# Patient Record
Sex: MALE | Race: OTHER | Employment: PART TIME | URBAN - METROPOLITAN AREA
[De-identification: names, ages, dates, MRNs, and addresses within clinical notes are randomized per-mention and may not be internally consistent; named-entity substitution may affect disease eponyms.]

---

## 2017-06-16 ENCOUNTER — HOSPITAL ENCOUNTER (EMERGENCY)
Age: 37
Discharge: HOME | End: 2017-06-17
Attending: EMERGENCY MEDICINE
Payer: SELF-PAY

## 2017-06-16 ENCOUNTER — APPOINTMENT (OUTPATIENT)
Dept: CT IMAGING | Age: 37
End: 2017-06-16
Attending: EMERGENCY MEDICINE
Payer: SELF-PAY

## 2017-06-16 VITALS
BODY MASS INDEX: 22.88 KG/M2 | HEART RATE: 98 BPM | OXYGEN SATURATION: 98 % | RESPIRATION RATE: 65 BRPM | HEIGHT: 64 IN | DIASTOLIC BLOOD PRESSURE: 62 MMHG | WEIGHT: 134 LBS | SYSTOLIC BLOOD PRESSURE: 134 MMHG

## 2017-06-16 DIAGNOSIS — R10.9 FLANK PAIN: Primary | ICD-10-CM

## 2017-06-16 LAB
ALBUMIN SERPL BCP-MCNC: 4.3 G/DL (ref 3.5–5)
ALBUMIN/GLOB SERPL: 1 {RATIO} (ref 1.2–3.5)
ALP SERPL-CCNC: 133 U/L (ref 50–136)
ALT SERPL-CCNC: 86 U/L (ref 12–65)
ANION GAP BLD CALC-SCNC: 10 MMOL/L (ref 7–16)
AST SERPL W P-5'-P-CCNC: 35 U/L (ref 15–37)
BASOPHILS # BLD AUTO: 0 K/UL (ref 0–0.2)
BASOPHILS # BLD: 0 % (ref 0–2)
BILIRUB SERPL-MCNC: 0.4 MG/DL (ref 0.2–1.1)
BUN SERPL-MCNC: 19 MG/DL (ref 6–23)
CALCIUM SERPL-MCNC: 9.2 MG/DL (ref 8.3–10.4)
CHLORIDE SERPL-SCNC: 103 MMOL/L (ref 98–107)
CO2 SERPL-SCNC: 28 MMOL/L (ref 21–32)
CREAT SERPL-MCNC: 0.82 MG/DL (ref 0.8–1.5)
DIFFERENTIAL METHOD BLD: ABNORMAL
EOSINOPHIL # BLD: 0.1 K/UL (ref 0–0.8)
EOSINOPHIL NFR BLD: 1 % (ref 0.5–7.8)
ERYTHROCYTE [DISTWIDTH] IN BLOOD BY AUTOMATED COUNT: 13.1 % (ref 11.9–14.6)
GLOBULIN SER CALC-MCNC: 4.5 G/DL (ref 2.3–3.5)
GLUCOSE SERPL-MCNC: 105 MG/DL (ref 65–100)
HCT VFR BLD AUTO: 47.5 % (ref 41.1–50.3)
HGB BLD-MCNC: 16 G/DL (ref 13.6–17.2)
IMM GRANULOCYTES # BLD: 0 K/UL (ref 0–0.5)
IMM GRANULOCYTES NFR BLD AUTO: 0.2 % (ref 0–5)
LYMPHOCYTES # BLD AUTO: 35 % (ref 13–44)
LYMPHOCYTES # BLD: 2.1 K/UL (ref 0.5–4.6)
MCH RBC QN AUTO: 30.8 PG (ref 26.1–32.9)
MCHC RBC AUTO-ENTMCNC: 33.7 G/DL (ref 31.4–35)
MCV RBC AUTO: 91.3 FL (ref 79.6–97.8)
MONOCYTES # BLD: 0.6 K/UL (ref 0.1–1.3)
MONOCYTES NFR BLD AUTO: 11 % (ref 4–12)
NEUTS SEG # BLD: 3.1 K/UL (ref 1.7–8.2)
NEUTS SEG NFR BLD AUTO: 53 % (ref 43–78)
PLATELET # BLD AUTO: 276 K/UL (ref 150–450)
PMV BLD AUTO: 10.3 FL (ref 10.8–14.1)
POTASSIUM SERPL-SCNC: 3.6 MMOL/L (ref 3.5–5.1)
PROT SERPL-MCNC: 8.8 G/DL (ref 6.3–8.2)
RBC # BLD AUTO: 5.2 M/UL (ref 4.23–5.67)
SODIUM SERPL-SCNC: 141 MMOL/L (ref 136–145)
WBC # BLD AUTO: 6 K/UL (ref 4.3–11.1)

## 2017-06-16 PROCEDURE — 99283 EMERGENCY DEPT VISIT LOW MDM: CPT | Performed by: EMERGENCY MEDICINE

## 2017-06-16 PROCEDURE — 96374 THER/PROPH/DIAG INJ IV PUSH: CPT | Performed by: EMERGENCY MEDICINE

## 2017-06-16 PROCEDURE — 85025 COMPLETE CBC W/AUTO DIFF WBC: CPT | Performed by: EMERGENCY MEDICINE

## 2017-06-16 PROCEDURE — 80053 COMPREHEN METABOLIC PANEL: CPT | Performed by: EMERGENCY MEDICINE

## 2017-06-16 PROCEDURE — 83690 ASSAY OF LIPASE: CPT | Performed by: EMERGENCY MEDICINE

## 2017-06-16 PROCEDURE — 74176 CT ABD & PELVIS W/O CONTRAST: CPT

## 2017-06-16 PROCEDURE — 81003 URINALYSIS AUTO W/O SCOPE: CPT | Performed by: EMERGENCY MEDICINE

## 2017-06-16 RX ORDER — KETOROLAC TROMETHAMINE 30 MG/ML
30 INJECTION, SOLUTION INTRAMUSCULAR; INTRAVENOUS
Status: COMPLETED | OUTPATIENT
Start: 2017-06-16 | End: 2017-06-17

## 2017-06-17 LAB — LIPASE SERPL-CCNC: 177 U/L (ref 73–393)

## 2017-06-17 PROCEDURE — 74011250636 HC RX REV CODE- 250/636: Performed by: EMERGENCY MEDICINE

## 2017-06-17 RX ORDER — TRAMADOL HYDROCHLORIDE 50 MG/1
50 TABLET ORAL
Qty: 15 TAB | Refills: 0 | Status: SHIPPED | OUTPATIENT
Start: 2017-06-17 | End: 2020-12-01

## 2017-06-17 RX ADMIN — KETOROLAC TROMETHAMINE 30 MG: 30 INJECTION, SOLUTION INTRAMUSCULAR at 00:37

## 2017-06-17 NOTE — ED PROVIDER NOTES
HPI Comments: Patient is a 40-year-old male presenting with left flank pain. Reports the pain started 4 days ago. Reports worse with movement and palpation. No history of kidney stones. No previous intra-abdominal surgeries. No significant fevers, shortness of breath or abdominal pain. Patient has been taking ibuprofen without significant relief. Patient works in [employer] in is frequently lifting but cannot recal certain injury. Patient is a 40 y.o. male presenting with flank pain. The history is provided by the patient. No  was used. Flank Pain    Pertinent negatives include no fever, no headaches, no abdominal pain and no dysuria. No past medical history on file. No past surgical history on file. No family history on file. Social History     Social History    Marital status:      Spouse name: N/A    Number of children: N/A    Years of education: N/A     Occupational History    Not on file. Social History Main Topics    Smoking status: Not on file    Smokeless tobacco: Not on file    Alcohol use No    Drug use: Not on file    Sexual activity: Not on file     Other Topics Concern    Not on file     Social History Narrative    No narrative on file         ALLERGIES: Pcn [penicillins]    Review of Systems   Constitutional: Negative for fatigue and fever. HENT: Negative for sore throat. Eyes: Negative for pain. Respiratory: Negative for cough, chest tightness and shortness of breath. Cardiovascular: Negative for leg swelling. Gastrointestinal: Negative for abdominal pain, nausea and vomiting. Genitourinary: Positive for flank pain. Negative for dysuria. Musculoskeletal: Negative for back pain. Neurological: Negative for syncope and headaches.        Vitals:    06/16/17 2241   BP: 134/62   Pulse: 98   Resp: (!) 65   SpO2: 98%   Weight: 60.8 kg (134 lb)   Height: 5' 4\" (1.626 m)            Physical Exam   Constitutional: He is oriented to person, place, and time. He appears well-developed and well-nourished. No distress. HENT:   Head: Normocephalic and atraumatic. Eyes: Conjunctivae and EOM are normal. Pupils are equal, round, and reactive to light. Neck: Normal range of motion. Neck supple. Cardiovascular: Normal rate, regular rhythm and normal heart sounds. Pulmonary/Chest: Effort normal and breath sounds normal. No respiratory distress. He has no wheezes. He has no rales. Abdominal: Soft. He exhibits no distension. There is no tenderness. There is no rebound. Musculoskeletal: Normal range of motion. He exhibits tenderness. He exhibits no edema. Arms:  Neurological: He is alert and oriented to person, place, and time. Skin: Skin is warm and dry. No rash noted. He is not diaphoretic. Psychiatric: He has a normal mood and affect. His behavior is normal.   Vitals reviewed. MDM  Number of Diagnoses or Management Options  Flank pain: new and does not require workup  Diagnosis management comments: Small blood noted in urine but otherwise no signs of infection. I suspect the patient may have had a small kidney stone that he passed now with some residual pain. Pain could also be MSK given his job requires a great deal of lifting. We'll send him home with short course of pain medications. Strict return precautions discussed. Discussed no driving on pain medications. I discussed the results of all labs, procedures, radiographs, and treatments with the patient and available family. Treatment plan is agreed upon and the patient is ready for discharge. Questions about treatment in the ED and differential diagnosis of presenting condition were answered. Patient was given verbal discharge instructions including, but not limited to, importance of returning to the emergency department for any concern of worsening or continued symptoms.   Instructions were given to follow up with a primary care provider or specialist within 1-2 days. Adverse effects of medications, if prescribed, were discussed and patient was advised to refrain from significant physical activity until followed up by primary care physician and to not drive or operate heavy machinery after taking any sedating substances.           Amount and/or Complexity of Data Reviewed  Clinical lab tests: ordered and reviewed (Results for orders placed or performed during the hospital encounter of 06/16/17  -CBC WITH AUTOMATED DIFF       Result                                            Value                         Ref Range                       WBC                                               6.0                           4.3 - 11.1 K/uL                 RBC                                               5.20                          4.23 - 5.67 M/uL                HGB                                               16.0                          13.6 - 17.2 g/dL                HCT                                               47.5                          41.1 - 50.3 %                   MCV                                               91.3                          79.6 - 97.8 FL                  MCH                                               30.8                          26.1 - 32.9 PG                  MCHC                                              33.7                          31.4 - 35.0 g/dL                RDW                                               13.1                          11.9 - 14.6 %                   PLATELET                                          276                           150 - 450 K/uL                  MPV                                               10.3 (L)                      10.8 - 14.1 FL                  DF                                                AUTOMATED                                                     NEUTROPHILS                                       53                            43 - 78 %                       LYMPHOCYTES 35                            13 - 44 %                       MONOCYTES                                         11                            4.0 - 12.0 %                    EOSINOPHILS                                       1                             0.5 - 7.8 %                     BASOPHILS                                         0                             0.0 - 2.0 %                     IMMATURE GRANULOCYTES                             0.2                           0.0 - 5.0 %                     ABS. NEUTROPHILS                                  3.1                           1.7 - 8.2 K/UL                  ABS. LYMPHOCYTES                                  2.1                           0.5 - 4.6 K/UL                  ABS. MONOCYTES                                    0.6                           0.1 - 1.3 K/UL                  ABS. EOSINOPHILS                                  0.1                           0.0 - 0.8 K/UL                  ABS. BASOPHILS                                    0.0                           0.0 - 0.2 K/UL                  ABS. IMM.  GRANS.                                  0.0                           0.0 - 0.5 K/UL             -METABOLIC PANEL, COMPREHENSIVE       Result                                            Value                         Ref Range                       Sodium                                            141                           136 - 145 mmol/L                Potassium                                         3.6                           3.5 - 5.1 mmol/L                Chloride                                          103                           98 - 107 mmol/L                 CO2                                               28                            21 - 32 mmol/L                  Anion gap                                         10                            7 - 16 mmol/L                   Glucose 105 (H)                       65 - 100 mg/dL                  BUN                                               19                            6 - 23 MG/DL                    Creatinine                                        0.82                          0.8 - 1.5 MG/DL                 GFR est AA                                        >60                           >60 ml/min/1.73m2               GFR est non-AA                                    >60                           >60 ml/min/1.73m2               Calcium                                           9.2                           8.3 - 10.4 MG/DL                Bilirubin, total                                  0.4                           0.2 - 1.1 MG/DL                 ALT (SGPT)                                        86 (H)                        12 - 65 U/L                     AST (SGOT)                                        35                            15 - 37 U/L                     Alk. phosphatase                                  133                           50 - 136 U/L                    Protein, total                                    8.8 (H)                       6.3 - 8.2 g/dL                  Albumin                                           4.3                           3.5 - 5.0 g/dL                  Globulin                                          4.5 (H)                       2.3 - 3.5 g/dL                  A-G Ratio                                         1.0 (L)                       1.2 - 3.5                  -LIPASE       Result                                            Value                         Ref Range                       Lipase                                            177                           73 - 393 U/L               )  Tests in the radiology section of CPT®: ordered and reviewed (Ct Urogram Wo Cont    Result Date: 6/16/2017  CT ABDOMEN AND PELVIS WITHOUT CONTRAST HISTORY: Left flank pain COMPARISON: None. TECHNIQUE: Helical imaging was performed from the lung bases through the ischial tuberosities without intravenous contrast. Oral contrast was not administered. Coronal and sagittal reformats were performed. Dose reduction techniques used: Automated exposure control, adjustment of the mAs and/or kVp according to patient's size, and iterative reconstruction techniques. FINDINGS: *  LUNG BASES: Within normal limits. *  LIVER: Within normal limits. *  GALLBLADDER AND BILE DUCTS: Normal. *  SPLEEN: Within normal limits. *  URINARY TRACT: Within normal limits. *  ADRENALS: Within normal limits. *  PANCREAS: Within normal limits. *  GASTROINTESTINAL TRACT: Within normal limits. *  RETROPERITONEUM: Within normal limits. *  PERITONEAL CAVITY AND ABDOMINAL WALL: Within normal limits. *  PELVIS: Within normal limits. *  SPINE / BONES: Within normal limits. *  OTHER COMMENTS: None. IMPRESSION: No acute abnormalities.   Evaluation of the abdominal viscera is limited without intravenous contrast. Date of Dictation: 6/16/2017 11:27 PM     )  Review and summarize past medical records: yes  Independent visualization of images, tracings, or specimens: yes    Risk of Complications, Morbidity, and/or Mortality  Presenting problems: moderate  Diagnostic procedures: moderate  Management options: moderate    Patient Progress  Patient progress: improved    ED Course       Procedures

## 2020-12-01 ENCOUNTER — HOSPITAL ENCOUNTER (EMERGENCY)
Age: 40
Discharge: HOME | End: 2020-12-01
Attending: EMERGENCY MEDICINE

## 2020-12-01 VITALS
DIASTOLIC BLOOD PRESSURE: 90 MMHG | RESPIRATION RATE: 14 BRPM | TEMPERATURE: 98.3 F | WEIGHT: 134 LBS | SYSTOLIC BLOOD PRESSURE: 156 MMHG | OXYGEN SATURATION: 99 % | HEART RATE: 76 BPM | HEIGHT: 64 IN | BODY MASS INDEX: 22.88 KG/M2

## 2020-12-01 DIAGNOSIS — S61.211A LACERATION OF LEFT INDEX FINGER WITHOUT FOREIGN BODY WITHOUT DAMAGE TO NAIL, INITIAL ENCOUNTER: Primary | ICD-10-CM

## 2020-12-01 PROCEDURE — 99282 EMERGENCY DEPT VISIT SF MDM: CPT

## 2020-12-01 PROCEDURE — 75810000293 HC SIMP/SUPERF WND  RPR

## 2020-12-01 NOTE — DISCHARGE INSTRUCTIONS
Patient Education        Ponce en la mano cerrados con puntos: Instrucciones de cuidado  Cuts on the [employer] Closed With Stitches: Care Instructions  Instrucciones de cuidado    Un inga en la mano puede ser en los dedos, el pulgar o la randall o el dorso de la [place]. A veces, un inga puede lesionar los tendones, los vasos sanguíneos o los nervios de la [place]. El médico utilizó puntos para cerrar el inga. Usar puntos también ayuda a que sane el inga y reduce la formación de cicatrices. Es posible que el médico le haya dado ra tablilla (férula) para ayudar a evitar que [employer] mano, los dedos o el pulgar. Si el inga fue profundo y a través de la piel, el médico [employer] capas de puntos. En la capa más profunda, se juntan las partes profundas del inga. Esos puntos se disolverán, y no es necesario quitarlos. Los puntos de la capa superior son los que ve en el inga. Es probable que le pongan ra venda. Será necesario que tricia [facility] puntos, por lo general al cabo de 7 a 14 lito. El médico puede sugerirle simon a un especialista de la mano si el inga es muy profundo, si tiene problemas para  los dedos o si tiene menos sensibilidad en la mano. El médico lo kim revisado cuidadosamente, chance pueden aparecer [employer] tarde. Si observa algún problema o un síntoma nuevo, obtenga tratamiento médico de inmediato. La atención de seguimiento es ra parte clave de cutler tratamiento y seguridad. Asegúrese de hacer y acudir a todas las citas, y llame a cutler médico si está teniendo problemas. También es ra buena idea saber los resultados de rafael exámenes y mantener ra lista de los medicamentos que bianca. ¿Cómo puede cuidarse en el hogar? · [address] primeras 24 a 48 horas. Después de esto, puede ducharse si cutler médico lo aprueba. Seque el inga con toques suaves de toalla. · No sumerja el inga, margo, por ejemplo, en ra bañera. Cutler médico le dirá cuándo es seguro mojar el inga.   · Si cutler médico le dijo cómo cuidarse el inga, siga las instrucciones de rosenberg médico. Si no le juan instrucciones, siga estos consejos generales:  ? Después de las primeras 24 a 48 horas, lávese la lulu alrededor del inga con agua limpia 2 veces al día. No use peróxido de hidrógeno (agua [place]) ni alcohol, los cuales pueden retrasar la sanación. ? Puede cubrir el inga con ra capa delgada de vaselina y Ruthanna Shavon venda no adherente. ? Aplíquese más vaselina y [facility] la venda según sea necesario. · Mantenga elevada la mano adolorida sobre ra almohada en cualquier momento que se siente o se acueste narciso los 3 días siguientes. Trate de mantenerla por encima del nivel del corazón. [place] ayudará a reducir la hinchazón. · Evite cualquier actividad que podría hacer que el inga se vuelva a abrir. · No se quite los puntos usted mismo. Rosenberg médico le dirá cuándo regresar para [employer]. · Sea michael con los medicamentos. [place] los analgésicos (medicamentos para el dolor) exactamente según lo indicado. ? Si el médico le recetó un analgésico, tómelo según las indicaciones. ? Si no está tomando un analgésico recetado, pregúntele a rosenberg médico si puede michael ángel de [employer]. ¿Cuándo debe pedir ayuda? Llame a rosenberg médico ahora mismo o busque atención médica inmediata si:    · Siente un dolor nuevo o el dolor empeora.     · La piel cercana al inga está fría o pálida, o cambia de color.     · Siente hormigueo, debilitamiento o entumecimiento cerca del inga.     · El inga comienza a sangrar, y la diane empapa la venda. Es normal que salgan pequeñas cantidades de [place].     · Tiene dificultades para  la lulu de la mano cercana al inga.     · Tiene síntomas de infección, tales margo:  ? Aumento del dolor, la hinchazón, el enrojecimiento o la temperatura alrededor del inga. ? Vetas rojizas que salen del inga. ? Pus que sale del inga. ? Fiebre.    Preste especial atención a los [facility] y asegúrese de comunicarse con rosenberg médico si:    · No mejora margo se esperaba. ¿Dónde puede encontrar más información en inglés? Vaya a [email or web address]  Prema T250 en la búsqueda para aprender más acerca de \"Ponce en la mano cerrados con puntos: Instrucciones de cuidado. \"  Revisado: 26 junio, 6792               GIANNI del contenido: 12.6  © 1335-7573 Healthwise, Incorporated. Las instrucciones de cuidado fueron adaptadas bajo licencia por [employer] (which disclaims liability or warranty for this information). Si usted tiene [place] Gamaliel afección médica o sobre estas instrucciones, siempre pregunte a rosenberg profesional de yamil. Healthwise, Incorporated niega toda garantía o responsabilidad por rosenberg uso de esta información.

## 2020-12-01 NOTE — ED PROVIDER NOTES
Patient to ER status post laceration to the left index finger while he was using a knife at work. He is right-handed he is up-to-date on tetanus    The history is provided by the patient. Laceration    The incident occurred less than 1 hour ago. Pain location: left Index finger. The laceration is 1 cm in size. The injury mechanism is a clean knife. Foreign body present: no. The pain is at a severity of 4/10. The pain is mild. The pain has been constant since onset. Pertinent negatives include no numbness, no tingling, no weakness, no loss of motion, no coolness and no discoloration. The patient's last tetanus shot was less than 5 years ago. No past medical history on file. No past surgical history on file. No family history on file.     Social History     Socioeconomic History    Marital status:      Spouse name: Not on file    Number of children: Not on file    Years of education: Not on file    Highest education level: Not on file   Occupational History    Not on file   Social Needs    Financial resource strain: Not on file    Food insecurity     Worry: Not on file     Inability: Not on file    Transportation needs     Medical: Not on file     Non-medical: Not on file   Tobacco Use    Smoking status: Not on file   Substance and Sexual Activity    Alcohol use: No    Drug use: Not on file    Sexual activity: Not on file   Lifestyle    Physical activity     Days per week: Not on file     Minutes per session: Not on file    Stress: Not on file   Relationships    Social connections     Talks on phone: Not on file     Gets together: Not on file     Attends Jain service: Not on file     Active member of club or organization: Not on file     Attends meetings of clubs or organizations: Not on file     Relationship status: Not on file    Intimate partner violence     Fear of current or ex partner: Not on file     Emotionally abused: Not on file     Physically abused: Not on file Forced sexual activity: Not on file   Other Topics Concern    Not on file   Social History Narrative    Not on file         ALLERGIES: Pcn [penicillins]    Review of Systems   Neurological: Negative for tingling, weakness and numbness. All other systems reviewed and are negative. Vitals:    12/01/20 1715   BP: (!) 156/90   Pulse: 76   Resp: 14   Temp: 98.3 °F (36.8 °C)   SpO2: 99%   Weight: 60.8 kg (134 lb)   Height: 5' 4\" (1.626 m)            Physical Exam  Vitals signs and nursing note reviewed. Constitutional:       General: He is not in acute distress. Appearance: He is well-developed. He is not diaphoretic. HENT:      Head: Normocephalic and atraumatic. Eyes:      Pupils: Pupils are equal, round, and reactive to light. Neck:      Musculoskeletal: Normal range of motion and neck supple. Cardiovascular:      Rate and Rhythm: Normal rate and regular rhythm. Pulmonary:      Effort: Pulmonary effort is normal.      Breath sounds: Normal breath sounds. Abdominal:      General: Bowel sounds are normal.      Palpations: Abdomen is soft. Musculoskeletal: Normal range of motion. Comments: Index finger with approximately centimeter flap laceration to the lateral aspect of the distal phalanx full range of motion full sensation to the tip of the finger   Skin:     General: Skin is warm. Neurological:      Mental Status: He is alert and oriented to person, place, and time.           MDM  Number of Diagnoses or Management Options  Diagnosis management comments: 1.0 cm lac to left index finger with 4 suture repair  Sutures to be removed in 10 days, may return to er for removal, watch for any signs of infection       Amount and/or Complexity of Data Reviewed  Review and summarize past medical records: yes    Risk of Complications, Morbidity, and/or Mortality  Presenting problems: low  Diagnostic procedures: low  Management options: low    Patient Progress  Patient progress: improved Wound Repair    Date/Time: 12/1/2020 5:53 PM  Performed by: [place] provider: Abraham Pavon  Preparation: skin prepped with Betadine  Pre-procedure re-eval: Immediately prior to the procedure, the patient was reevaluated and found suitable for the planned procedure and any planned medications. Time out: Immediately prior to the procedure a time out was called to verify the correct patient, procedure, equipment, staff and marking as appropriate. .  Location details: left index finger  Wound length:2.5 cm or less  Anesthesia: local infiltration    Anesthesia:  Local Anesthetic: lidocaine 1% without epinephrine  Anesthetic total: 4 mL  Foreign bodies: no foreign bodies  Debridement: none  Skin closure: 4-0 nylon  Number of sutures: 4  Technique: simple and interrupted  Approximation: close  Dressing: antibiotic ointment, 4x4 and gauze roll  Patient tolerance: Patient tolerated the procedure well with no immediate complications  My total time at bedside, performing this procedure was 1-15 minutes.

## 2020-12-01 NOTE — ED NOTES
Laceration to left 2nd digit, pt states he cut it with a knife. Last tetanus shot was 4 years ago. Bleeding is controlled at this time.

## 2020-12-01 NOTE — ED NOTES
I have reviewed discharge instructions with the patient. The patient verbalized understanding. Patient left ED via Discharge Method: ambulatory to Home with self. Opportunity for questions and clarification provided. Patient given 0 scripts. To continue your aftercare when you leave the hospital, you may receive an automated call from our care team to check in on how you are doing. This is a free service and part of our promise to provide the best care and service to meet your aftercare needs.  If you have questions, or wish to unsubscribe from this service please call [phone]. Thank you for Choosing our [facility] Emergency Department.